# Patient Record
Sex: FEMALE | Race: WHITE
[De-identification: names, ages, dates, MRNs, and addresses within clinical notes are randomized per-mention and may not be internally consistent; named-entity substitution may affect disease eponyms.]

---

## 2019-02-11 ENCOUNTER — HOSPITAL ENCOUNTER (EMERGENCY)
Dept: HOSPITAL 39 - ER | Age: 29
Discharge: HOME | End: 2019-02-11
Payer: SELF-PAY

## 2019-02-11 VITALS — OXYGEN SATURATION: 98 %

## 2019-02-11 VITALS — TEMPERATURE: 98 F | DIASTOLIC BLOOD PRESSURE: 102 MMHG | SYSTOLIC BLOOD PRESSURE: 139 MMHG

## 2019-02-11 DIAGNOSIS — S20.362A: Primary | ICD-10-CM
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
## 2019-02-11 NOTE — ED.PDOC
History of Present Illness





- General


Chief Complaint: Bite: Animal/Insect/Human


Stated Complaint: insect bite to left upper chest


Time Seen by Provider: 02/11/19 19:21


Source: patient


Exam Limitations: no limitations





- History of Present Illness


Initial Comments: 





Patient presents with an insect bite to the upper left chest for three days.  It

started off painful then developed a pustule.  She was rubbing it with some 

herbal remedies and the pustule broke off.  It is not pruritic. She has a 

history of a gluteal abscess but says it was not "Staph".  No other complaints. 


Timing/Duration: other - 3 days


Severity: mild


Improving Factors: nothing


Worsening Factors: nothing


Associated Symptoms: denies symptoms


Allergies/Adverse Reactions: 


Allergies





NO KNOWN ALLERGY Allergy (Verified 02/11/19 19:16)


   








Home Medications: 


Ambulatory Orders





Amphetamine-Dextroamphetamine [Adderall] 1 tab PO 02/11/19 


Cephalexin Monohydrate [Keflex] 500 mg PO BID #13 cap 02/11/19 











Review of Systems





- Review of Systems


Constitutional: States: no symptoms reported


EENTM: States: no symptoms reported


Respiratory: States: no symptoms reported


Cardiology: States: no symptoms reported


Gastrointestinal/Abdominal: States: no symptoms reported


Genitourinary: States: no symptoms reported


Musculoskeletal: States: no symptoms reported


Skin: States: see HPI


Neurological: States: no symptoms reported


Endocrine: States: no symptoms reported


Hematologic/Lymphatic: States: no symptoms reported





Past Medical History (General)





- Patient Medical History


Hx Diabetes: No


Surgical History: tonsillectomy





- Vaccination History


Hx Influenza Vaccination: No





- Female History


Patient is a Female of Child Bearing Age (10 -59 yrs old): Yes


Patient Pregnant: No





Family Medical History





- Family History


  ** Mother


Family History: Unknown





Physical Exam





- Physical Exam


General Appearance: Alert


Respiratory: lungs clear, normal breath sounds


Cardiovascular/Chest: regular rate, rhythm


Gastrointestinal/Abdominal: normal bowel sounds, non tender, soft


Skin Exam: other - quarter size blanching erythmatic circular lesion with a 0.5 

cm center of excoriation on the left upper chest.  Mildly TTP. No exudates. 





Progress





- Progress


Progress: 





02/11/19 19:49


Wound cleaned and dressed.  Keflex 500 mg po x one given in the E.D. RX sent for

 7 days.  Care instructions given. E.R. warnings given. Questions were elicited 

and answered. Patient voiced understanding and agreement with the plan.





Departure





- Departure


Clinical Impression: 


 Insect bites





Disposition: Discharge to Home or Self Care


Condition: Good


Departure Forms:  ED Discharge - Pt. Copy, Patient Portal Self Enrollment


Instructions:  DI for Insect Bites and Stings


Diet: resume usual diet


Activity: increase activity as tolerated


Prescriptions: 


Cephalexin Monohydrate [Keflex] 500 mg PO BID #13 cap


Home Medications: 


Ambulatory Orders





Amphetamine-Dextroamphetamine [Adderall] 1 tab PO 02/11/19 


Cephalexin Monohydrate [Keflex] 500 mg PO BID #13 cap 02/11/19 








Additional Instructions: 


Apply neosporin to the wound twice per day until healed.  Take prescription as 

directed. Return to the E.R. for worsening infection, pus, or worsening pain. 

Return to your regular doctor or the E.R. if the wound has not healed in 7-10 

days.